# Patient Record
Sex: FEMALE | Race: WHITE | NOT HISPANIC OR LATINO | Employment: FULL TIME | URBAN - METROPOLITAN AREA
[De-identification: names, ages, dates, MRNs, and addresses within clinical notes are randomized per-mention and may not be internally consistent; named-entity substitution may affect disease eponyms.]

---

## 2021-02-17 ENCOUNTER — HOSPITAL ENCOUNTER (OUTPATIENT)
Dept: ULTRASOUND IMAGING | Age: 41
Discharge: HOME | End: 2021-02-17

## 2021-02-17 DIAGNOSIS — R60.0 EDEMA OF LEFT FOOT: ICD-10-CM

## 2021-02-17 PROCEDURE — 93926 LOWER EXTREMITY STUDY: CPT

## 2021-05-01 ENCOUNTER — IMMUNIZATION (OUTPATIENT)
Dept: LAB | Age: 41
End: 2021-05-01

## 2021-05-01 DIAGNOSIS — Z23 NEED FOR VACCINATION: Primary | ICD-10-CM

## 2021-05-01 PROCEDURE — 0001A COVID 19 PFIZER-BIONTECH: CPT | Performed by: HOSPITALIST

## 2021-05-01 PROCEDURE — 91300 COVID 19 PFIZER-BIONTECH: CPT | Performed by: HOSPITALIST

## 2021-05-22 ENCOUNTER — IMMUNIZATION (OUTPATIENT)
Dept: LAB | Age: 41
End: 2021-05-22
Attending: HOSPITALIST

## 2021-05-22 DIAGNOSIS — Z23 NEED FOR VACCINATION: Primary | ICD-10-CM

## 2021-05-22 PROCEDURE — 91300 COVID 19 PFIZER-BIONTECH: CPT | Performed by: HOSPITALIST

## 2021-05-22 PROCEDURE — 0002A COVID 19 PFIZER-BIONTECH: CPT | Performed by: HOSPITALIST

## 2021-08-06 PROBLEM — R20.0 NUMBNESS OF LEFT FOOT: Status: ACTIVE | Noted: 2021-08-06

## 2021-08-06 PROBLEM — M51.36 DDD (DEGENERATIVE DISC DISEASE), LUMBAR: Status: ACTIVE | Noted: 2021-08-06

## 2021-08-06 PROBLEM — R20.8 NUMBNESS OF LEFT FOOT: Status: ACTIVE | Noted: 2021-08-06

## 2021-09-29 ENCOUNTER — ORDER TRANSCRIPTION (OUTPATIENT)
Dept: ADMINISTRATIVE | Facility: HOSPITAL | Age: 41
End: 2021-09-29

## 2021-09-29 DIAGNOSIS — R93.7 ABNORMAL FINDINGS ON DIAGNOSTIC IMAGING OF MUSCULOSKELETAL SYSTEM: ICD-10-CM

## 2021-09-29 DIAGNOSIS — Z11.59 ENCOUNTER FOR SCREENING FOR OTHER VIRAL DISEASES: ICD-10-CM

## 2021-09-29 DIAGNOSIS — M54.16 LUMBAR RADICULOPATHY: Primary | ICD-10-CM

## 2021-09-29 DIAGNOSIS — Z01.818 PRE-OP TESTING: Primary | ICD-10-CM

## 2021-10-18 ENCOUNTER — LAB ENCOUNTER (OUTPATIENT)
Dept: LAB | Age: 41
End: 2021-10-18
Attending: PHYSICAL MEDICINE & REHABILITATION
Payer: COMMERCIAL

## 2021-10-18 DIAGNOSIS — Z11.59 ENCOUNTER FOR SCREENING FOR OTHER VIRAL DISEASES: ICD-10-CM

## 2021-10-18 DIAGNOSIS — Z01.818 PRE-OP TESTING: ICD-10-CM

## 2021-10-21 ENCOUNTER — NURSE ONLY (OUTPATIENT)
Dept: LAB | Facility: HOSPITAL | Age: 41
End: 2021-10-21
Attending: PHYSICAL MEDICINE & REHABILITATION
Payer: COMMERCIAL

## 2021-10-21 ENCOUNTER — HOSPITAL ENCOUNTER (OUTPATIENT)
Dept: CT IMAGING | Facility: HOSPITAL | Age: 41
Discharge: HOME | End: 2021-10-21
Attending: PHYSICAL MEDICINE & REHABILITATION
Payer: COMMERCIAL

## 2021-10-21 ENCOUNTER — HOSPITAL ENCOUNTER (OUTPATIENT)
Dept: GENERAL RADIOLOGY | Facility: HOSPITAL | Age: 41
Discharge: HOME | End: 2021-10-21
Attending: PHYSICAL MEDICINE & REHABILITATION
Payer: COMMERCIAL

## 2021-10-21 VITALS
DIASTOLIC BLOOD PRESSURE: 72 MMHG | RESPIRATION RATE: 25 BRPM | SYSTOLIC BLOOD PRESSURE: 114 MMHG | HEART RATE: 60 BPM | TEMPERATURE: 97 F | OXYGEN SATURATION: 100 %

## 2021-10-21 VITALS
HEIGHT: 64 IN | BODY MASS INDEX: 23.05 KG/M2 | WEIGHT: 135 LBS | TEMPERATURE: 98 F | RESPIRATION RATE: 16 BRPM | DIASTOLIC BLOOD PRESSURE: 68 MMHG | HEART RATE: 62 BPM | OXYGEN SATURATION: 98 % | SYSTOLIC BLOOD PRESSURE: 107 MMHG

## 2021-10-21 DIAGNOSIS — M54.16 LUMBAR RADICULOPATHY: ICD-10-CM

## 2021-10-21 DIAGNOSIS — M51.36 DDD (DEGENERATIVE DISC DISEASE), LUMBAR: Primary | ICD-10-CM

## 2021-10-21 DIAGNOSIS — R93.7 ABNORMAL FINDINGS ON DIAGNOSTIC IMAGING OF MUSCULOSKELETAL SYSTEM: ICD-10-CM

## 2021-10-21 DIAGNOSIS — M51.36 DDD (DEGENERATIVE DISC DISEASE), LUMBAR: ICD-10-CM

## 2021-10-21 PROCEDURE — 72132 CT LUMBAR SPINE W/DYE: CPT | Performed by: PHYSICAL MEDICINE & REHABILITATION

## 2021-10-21 PROCEDURE — 36415 COLL VENOUS BLD VENIPUNCTURE: CPT

## 2021-10-21 PROCEDURE — 81025 URINE PREGNANCY TEST: CPT

## 2021-10-21 PROCEDURE — 85610 PROTHROMBIN TIME: CPT

## 2021-10-21 PROCEDURE — 85049 AUTOMATED PLATELET COUNT: CPT

## 2021-10-21 PROCEDURE — 62304 MYELOGRAPHY LUMBAR INJECTION: CPT | Performed by: PHYSICAL MEDICINE & REHABILITATION

## 2021-10-21 RX ORDER — DIAZEPAM 5 MG/1
10 TABLET ORAL
Status: COMPLETED | OUTPATIENT
Start: 2021-10-21 | End: 2021-10-21

## 2021-10-21 RX ORDER — DIAZEPAM 5 MG/1
TABLET ORAL
Status: COMPLETED
Start: 2021-10-21 | End: 2021-10-21

## 2021-10-21 RX ADMIN — DIAZEPAM 10 MG: 5 TABLET ORAL at 07:55:00

## 2021-10-21 NOTE — IMAGING NOTE
Pt scheduled for myelogram with Dr. Austin Rome. Took valium as ordered. Confirmed ride home. Report called to [facility] in [address].

## 2021-10-21 NOTE — PROCEDURES
[facility]  Procedure Note    Manohar Mooney Patient Status:  Outpatient    1980 MRN [id number]   [facility] X-RAY Attending Tabitha Libman, MD   Hosp Day # 0 PCP None Pcp     Procedure: lumbar myelogram     Pre-Procedure Diagnosi

## 2021-10-26 NOTE — PROGRESS NOTES
Ernesto Israel,  I did speak with  one of our surgeons about this result. He thinks this is well fused and does not require surgical intervention. I am glad we got it looked at.       Dr Bree Liang

## 2022-11-17 ENCOUNTER — HOSPITAL ENCOUNTER (OUTPATIENT)
Age: 42
Discharge: HOME | End: 2022-11-17
Attending: EMERGENCY MEDICINE
Payer: COMMERCIAL

## 2022-11-17 VITALS
DIASTOLIC BLOOD PRESSURE: 70 MMHG | HEART RATE: 90 BPM | TEMPERATURE: 98 F | OXYGEN SATURATION: 98 % | SYSTOLIC BLOOD PRESSURE: 120 MMHG | RESPIRATION RATE: 20 BRPM

## 2022-11-17 DIAGNOSIS — U07.1 COVID-19: Primary | ICD-10-CM

## 2022-11-17 LAB
POCT INFLUENZA A: NEGATIVE
POCT INFLUENZA B: NEGATIVE
SARS-COV-2 RNA RESP QL NAA+PROBE: DETECTED

## 2022-11-17 PROCEDURE — 99203 OFFICE O/P NEW LOW 30 MIN: CPT

## 2022-11-17 PROCEDURE — 87502 INFLUENZA DNA AMP PROBE: CPT | Performed by: EMERGENCY MEDICINE

## 2022-11-17 PROCEDURE — 99213 OFFICE O/P EST LOW 20 MIN: CPT

## 2023-12-03 ENCOUNTER — HOSPITAL ENCOUNTER (EMERGENCY)
Age: 43
Discharge: HOME | End: 2023-12-03
Attending: EMERGENCY MEDICINE

## 2023-12-03 VITALS
SYSTOLIC BLOOD PRESSURE: 121 MMHG | DIASTOLIC BLOOD PRESSURE: 74 MMHG | OXYGEN SATURATION: 96 % | TEMPERATURE: 96.8 F | BODY MASS INDEX: 24.75 KG/M2 | HEIGHT: 64 IN | RESPIRATION RATE: 19 BRPM | WEIGHT: 145 LBS

## 2023-12-03 DIAGNOSIS — S86.812A STRAIN OF LEFT CALF MUSCLE: Primary | ICD-10-CM

## 2023-12-03 DIAGNOSIS — T14.8XXA MUSCLE TEAR: ICD-10-CM

## 2023-12-08 ENCOUNTER — OFFICE VISIT (OUTPATIENT)
Dept: SPORTS MEDICINE | Age: 43
End: 2023-12-08

## 2023-12-08 VITALS — WEIGHT: 145 LBS | HEIGHT: 64 IN | BODY MASS INDEX: 24.75 KG/M2

## 2023-12-08 DIAGNOSIS — R20.0 NUMBNESS AND TINGLING OF LEFT LEG: ICD-10-CM

## 2023-12-08 DIAGNOSIS — M79.662 PAIN OF LEFT CALF: ICD-10-CM

## 2023-12-08 DIAGNOSIS — R20.2 NUMBNESS AND TINGLING OF LEFT LEG: ICD-10-CM

## 2023-12-08 DIAGNOSIS — S86.112A RUPTURE OF LEFT GASTROCNEMIUS TENDON, INITIAL ENCOUNTER: Primary | ICD-10-CM

## 2023-12-08 PROCEDURE — 99204 OFFICE O/P NEW MOD 45 MIN: CPT | Performed by: FAMILY MEDICINE

## 2023-12-08 PROCEDURE — L4387 NON-PNEUM WALK BOOT PRE OTS: HCPCS | Performed by: FAMILY MEDICINE

## 2023-12-08 RX ORDER — BUPROPION HYDROCHLORIDE 300 MG/1
300 TABLET ORAL
COMMUNITY
Start: 2023-12-04

## 2023-12-08 RX ORDER — NORETHINDRONE ACETATE AND ETHINYL ESTRADIOL 1MG-20(21)
KIT ORAL
COMMUNITY
Start: 2020-05-15

## 2023-12-08 ASSESSMENT — ENCOUNTER SYMPTOMS
COLOR CHANGE: 0
BACK PAIN: 1
NERVOUS/ANXIOUS: 0
NAUSEA: 0
FEVER: 0
VOMITING: 0
FATIGUE: 0
SORE THROAT: 0
CONFUSION: 0
DIARRHEA: 0
COUGH: 0
WOUND: 0
WHEEZING: 0
CHILLS: 0
EYE ITCHING: 1
NUMBNESS: 1
SINUS PAIN: 0
EYE REDNESS: 1
EYE PAIN: 0
SLEEP DISTURBANCE: 0
DIZZINESS: 0
CHEST TIGHTNESS: 0

## 2023-12-18 ENCOUNTER — HOSPITAL ENCOUNTER (OUTPATIENT)
Dept: PHYSICAL MEDICINE AND REHAB | Age: 43
Discharge: STILL ADMITTED OR ADMINISTRATIVE | End: 2023-12-18
Attending: FAMILY MEDICINE

## 2023-12-18 PROCEDURE — 97112 NEUROMUSCULAR REEDUCATION: CPT | Performed by: PHYSICAL THERAPIST

## 2023-12-18 PROCEDURE — 97161 PT EVAL LOW COMPLEX 20 MIN: CPT | Performed by: PHYSICAL THERAPIST

## 2023-12-18 ASSESSMENT — MOVEMENT AND STRENGTH ASSESSMENTS
HOPPING: EXTREME DIFFICULTY OR UNABLE TO PERFORM ACTIVITY
RUNNING ON EVEN GROUND: EXTREME DIFFICULTY OR UNABLE TO PERFORM ACTIVITY
WALKING BETWEEN ROOMS: QUITE A BIT OF DIFFICULTY
ROLLING OVER IN BED: A LITTLE BIT OF DIFFICULTY
SITTING FOR 1 HOUR: NO DIFFICULTY
MAKING SHARP TURNS WHILE RUNNING FAST: EXTREME DIFFICULTY OR UNABLE TO PERFORM ACTIVITY
WALKING A MILE: EXTREME DIFFICULTY OR UNABLE TO PERFORM ACTIVITY
GETTING INTO OR OUT OF A CAR: MODERATE DIFFICULTY
YOUR USUAL HOBBIES, RECREATIONAL OR SPORTING ACTIVIITIES: EXTREME DIFFICULTY OR UNABLE TO PERFORM ACTIVITY
RUNNING ON UNEVEN GROUND: EXTREME DIFFICULTY OR UNABLE TO PERFORM ACTIVITY
PERFORMING HEAVY ACTIVITIES AROUND YOUR HOME: EXTREME DIFFICULTY OR UNABLE TO PERFORM ACTIVITY
PERFORMING LIGHT ACTIVITES AROUND YOUR HOME: QUITE A BIT OF DIFFICULTY
STANDING FOR 1 HOUR: EXTREME DIFFICULTY OR UNABLE TO PERFORM ACTIVITY
PUTTING ON YOUR SHOES OR SOCKS: MODERATE DIFFICULTY
GETTING INTO OR OUT OF THE BATH: QUITE A BIT OF DIFFICULTY
LIFTING AN OBJECT, LIKE A BAG OF GROCERIES, FROM THE FLOOR: MODERATE DIFFICULTY
SQUATTING: EXTREME DIFFICULTY OR UNABLE TO PERFORM ACTIVITY
TOTAL SCORE: 25
WALKING 2 BLOCKS: EXTREME DIFFICULTY OR UNABLE TO PERFORM ACTIVITY
ANY OF YOUR USUAL WORK, HOUSEWORK OR SCHOOL ACTIVITIES: MODERATE DIFFICULTY
GOING UP OR DOWN 10 STAIRS (ABOUT 1 FLIGHT OF STAIRS): MODERATE DIFFICULTY

## 2023-12-18 ASSESSMENT — ENCOUNTER SYMPTOMS
QUALITY: ACHE
ALLEVIATING FACTORS: OVER-THE-COUNTER MEDICATION
ALLEVIATING FACTORS: CHANGE IN POSITION
PAIN SCALE AT LOWEST: 0
QUALITY: SHARP
PAIN FREQUENCY: INTERMITTENT
SUBJECTIVE PAIN PROGRESSION: IMPROVED
PAIN SEVERITY NOW: 2
PAIN SCALE AT HIGHEST: 8
ALLEVIATING FACTORS: REST
ALLEVIATING FACTORS: SUPPORTIVE DEVICE

## 2023-12-27 ENCOUNTER — APPOINTMENT (OUTPATIENT)
Dept: PHYSICAL MEDICINE AND REHAB | Age: 43
End: 2023-12-27

## 2023-12-27 PROCEDURE — 97112 NEUROMUSCULAR REEDUCATION: CPT | Performed by: PHYSICAL THERAPIST

## 2023-12-27 PROCEDURE — 97110 THERAPEUTIC EXERCISES: CPT | Performed by: PHYSICAL THERAPIST

## 2023-12-27 ASSESSMENT — ENCOUNTER SYMPTOMS: PAIN SEVERITY NOW: 0

## 2024-01-03 ENCOUNTER — HOSPITAL ENCOUNTER (OUTPATIENT)
Dept: PHYSICAL MEDICINE AND REHAB | Age: 44
Discharge: STILL ADMITTED OR ADMINISTRATIVE | End: 2024-01-03

## 2024-01-03 PROCEDURE — 97112 NEUROMUSCULAR REEDUCATION: CPT | Performed by: PHYSICAL THERAPIST

## 2024-01-03 PROCEDURE — 97110 THERAPEUTIC EXERCISES: CPT | Performed by: PHYSICAL THERAPIST

## 2024-01-03 ASSESSMENT — ENCOUNTER SYMPTOMS: PAIN SEVERITY NOW: 1

## 2024-01-04 ENCOUNTER — OFFICE VISIT (OUTPATIENT)
Dept: SPORTS MEDICINE | Age: 44
End: 2024-01-04

## 2024-01-04 VITALS
BODY MASS INDEX: 24.75 KG/M2 | DIASTOLIC BLOOD PRESSURE: 78 MMHG | WEIGHT: 145 LBS | HEIGHT: 64 IN | SYSTOLIC BLOOD PRESSURE: 136 MMHG | HEART RATE: 112 BPM

## 2024-01-04 DIAGNOSIS — S86.812D STRAIN OF CALF MUSCLE, LEFT, SUBSEQUENT ENCOUNTER: Primary | ICD-10-CM

## 2024-01-04 DIAGNOSIS — R20.0 NUMBNESS AND TINGLING OF LEFT LEG: ICD-10-CM

## 2024-01-04 DIAGNOSIS — R20.2 NUMBNESS AND TINGLING OF LEFT LEG: ICD-10-CM

## 2024-01-04 DIAGNOSIS — M79.662 PAIN OF LEFT CALF: ICD-10-CM

## 2024-01-04 PROCEDURE — 99213 OFFICE O/P EST LOW 20 MIN: CPT | Performed by: FAMILY MEDICINE

## 2024-01-04 ASSESSMENT — ENCOUNTER SYMPTOMS
ABDOMINAL PAIN: 0
NAUSEA: 0
SEIZURES: 0
TROUBLE SWALLOWING: 0
SLEEP DISTURBANCE: 0
EYE PAIN: 0
AGITATION: 0
FATIGUE: 0
DIZZINESS: 0
SINUS PRESSURE: 0
PHOTOPHOBIA: 0
EYE REDNESS: 0
NERVOUS/ANXIOUS: 0
SORE THROAT: 0
HEADACHES: 0
ACTIVITY CHANGE: 0
CHEST TIGHTNESS: 0
SHORTNESS OF BREATH: 0
NUMBNESS: 0
BACK PAIN: 0
DIARRHEA: 0
CONSTIPATION: 0
FEVER: 0
WHEEZING: 0
SINUS PAIN: 0

## 2024-01-05 ENCOUNTER — E-ADVICE (OUTPATIENT)
Dept: SPORTS MEDICINE | Age: 44
End: 2024-01-05

## 2024-01-09 ENCOUNTER — MED INFO FORMS (OUTPATIENT)
Dept: ORTHOPEDICS | Age: 44
End: 2024-01-09

## 2024-01-10 ENCOUNTER — HOSPITAL ENCOUNTER (OUTPATIENT)
Dept: PHYSICAL MEDICINE AND REHAB | Age: 44
Discharge: STILL ADMITTED OR ADMINISTRATIVE | End: 2024-01-10

## 2024-01-10 PROCEDURE — 97112 NEUROMUSCULAR REEDUCATION: CPT | Performed by: PHYSICAL THERAPIST

## 2024-01-10 PROCEDURE — 97110 THERAPEUTIC EXERCISES: CPT | Performed by: PHYSICAL THERAPIST

## 2024-01-10 PROCEDURE — 97140 MANUAL THERAPY 1/> REGIONS: CPT | Performed by: PHYSICAL THERAPIST

## 2024-01-10 ASSESSMENT — ENCOUNTER SYMPTOMS
PAIN SEVERITY NOW: 0
PAIN SCALE AT HIGHEST: 5

## 2024-01-17 ENCOUNTER — HOSPITAL ENCOUNTER (OUTPATIENT)
Dept: PHYSICAL MEDICINE AND REHAB | Age: 44
Discharge: STILL ADMITTED OR ADMINISTRATIVE | End: 2024-01-17

## 2024-01-17 PROCEDURE — 97112 NEUROMUSCULAR REEDUCATION: CPT | Performed by: PHYSICAL THERAPIST

## 2024-01-17 PROCEDURE — 97140 MANUAL THERAPY 1/> REGIONS: CPT | Performed by: PHYSICAL THERAPIST

## 2024-01-17 ASSESSMENT — ENCOUNTER SYMPTOMS: PAIN SEVERITY NOW: 1

## 2024-01-24 ENCOUNTER — HOSPITAL ENCOUNTER (OUTPATIENT)
Dept: PHYSICAL MEDICINE AND REHAB | Age: 44
Discharge: STILL ADMITTED OR ADMINISTRATIVE | End: 2024-01-24

## 2024-01-24 PROCEDURE — 97112 NEUROMUSCULAR REEDUCATION: CPT | Performed by: PHYSICAL THERAPIST

## 2024-01-24 PROCEDURE — 97140 MANUAL THERAPY 1/> REGIONS: CPT | Performed by: PHYSICAL THERAPIST

## 2024-01-24 PROCEDURE — 97110 THERAPEUTIC EXERCISES: CPT | Performed by: PHYSICAL THERAPIST

## 2024-01-24 ASSESSMENT — ENCOUNTER SYMPTOMS: PAIN SEVERITY NOW: 0

## 2024-01-31 ENCOUNTER — APPOINTMENT (OUTPATIENT)
Dept: PHYSICAL MEDICINE AND REHAB | Age: 44
End: 2024-01-31

## 2024-02-05 ENCOUNTER — APPOINTMENT (OUTPATIENT)
Dept: SPORTS MEDICINE | Age: 44
End: 2024-02-05

## 2024-02-05 VITALS
HEART RATE: 71 BPM | WEIGHT: 145 LBS | SYSTOLIC BLOOD PRESSURE: 108 MMHG | HEIGHT: 64 IN | BODY MASS INDEX: 24.75 KG/M2 | DIASTOLIC BLOOD PRESSURE: 74 MMHG

## 2024-02-05 DIAGNOSIS — S86.812D STRAIN OF CALF MUSCLE, LEFT, SUBSEQUENT ENCOUNTER: Primary | ICD-10-CM

## 2024-02-05 DIAGNOSIS — M79.662 PAIN OF LEFT CALF: ICD-10-CM

## 2024-02-05 PROCEDURE — 99213 OFFICE O/P EST LOW 20 MIN: CPT | Performed by: FAMILY MEDICINE

## 2024-02-05 ASSESSMENT — ENCOUNTER SYMPTOMS
WEAKNESS: 0
FEVER: 0
FATIGUE: 0
SLEEP DISTURBANCE: 0
TROUBLE SWALLOWING: 0
BACK PAIN: 0
HEADACHES: 0
VOMITING: 0
EYE PAIN: 0
NUMBNESS: 0
SHORTNESS OF BREATH: 0
NERVOUS/ANXIOUS: 0
AGITATION: 0
CHEST TIGHTNESS: 0
WHEEZING: 0
LIGHT-HEADEDNESS: 0
ABDOMINAL PAIN: 0
NAUSEA: 0
CONSTIPATION: 0
WOUND: 0
CHILLS: 0
DIARRHEA: 0
SORE THROAT: 0
DIZZINESS: 0

## 2024-02-07 ENCOUNTER — HOSPITAL ENCOUNTER (OUTPATIENT)
Dept: PHYSICAL MEDICINE AND REHAB | Age: 44
Discharge: STILL ADMITTED OR ADMINISTRATIVE | End: 2024-02-07

## 2024-02-07 PROCEDURE — 97110 THERAPEUTIC EXERCISES: CPT

## 2024-02-07 PROCEDURE — 97112 NEUROMUSCULAR REEDUCATION: CPT

## 2024-02-07 ASSESSMENT — ENCOUNTER SYMPTOMS: PAIN SEVERITY NOW: 2

## 2024-02-14 ENCOUNTER — HOSPITAL ENCOUNTER (OUTPATIENT)
Dept: PHYSICAL MEDICINE AND REHAB | Age: 44
Discharge: STILL ADMITTED OR ADMINISTRATIVE | End: 2024-02-14

## 2024-02-14 PROCEDURE — 97112 NEUROMUSCULAR REEDUCATION: CPT

## 2024-02-14 PROCEDURE — 97110 THERAPEUTIC EXERCISES: CPT

## 2024-02-14 ASSESSMENT — ENCOUNTER SYMPTOMS: PAIN SEVERITY NOW: 1

## 2024-02-21 ENCOUNTER — HOSPITAL ENCOUNTER (OUTPATIENT)
Dept: PHYSICAL MEDICINE AND REHAB | Age: 44
Discharge: STILL ADMITTED OR ADMINISTRATIVE | End: 2024-02-21

## 2024-02-21 PROCEDURE — 97110 THERAPEUTIC EXERCISES: CPT

## 2024-02-21 ASSESSMENT — ENCOUNTER SYMPTOMS
PAIN SCALE AT HIGHEST: 8
PAIN SCALE AT LOWEST: 3
PAIN SEVERITY NOW: 6

## 2024-02-28 ENCOUNTER — HOSPITAL ENCOUNTER (OUTPATIENT)
Dept: PHYSICAL MEDICINE AND REHAB | Age: 44
Discharge: STILL ADMITTED OR ADMINISTRATIVE | End: 2024-02-28

## 2024-02-28 PROCEDURE — 97530 THERAPEUTIC ACTIVITIES: CPT | Performed by: PHYSICAL THERAPIST

## 2024-02-28 PROCEDURE — 97110 THERAPEUTIC EXERCISES: CPT | Performed by: PHYSICAL THERAPIST

## 2024-02-28 ASSESSMENT — ENCOUNTER SYMPTOMS
PAIN SCALE AT LOWEST: 0
PAIN SCALE AT HIGHEST: 0
PAIN SEVERITY NOW: 0

## 2024-02-28 ASSESSMENT — MOVEMENT AND STRENGTH ASSESSMENTS
RUNNING ON EVEN GROUND: NO DIFFICULTY
PERFORMING HEAVY ACTIVITIES AROUND YOUR HOME: A LITTLE BIT OF DIFFICULTY
RUNNING ON UNEVEN GROUND: A LITTLE BIT OF DIFFICULTY
GETTING INTO OR OUT OF THE BATH: NO DIFFICULTY
STANDING FOR 1 HOUR: NO DIFFICULTY
SQUATTING: NO DIFFICULTY
WALKING A MILE: NO DIFFICULTY
GETTING INTO OR OUT OF A CAR: NO DIFFICULTY
PERFORMING LIGHT ACTIVITES AROUND YOUR HOME: NO DIFFICULTY
TOTAL SCORE: 96.25
GOING UP OR DOWN 10 STAIRS (ABOUT 1 FLIGHT OF STAIRS): NO DIFFICULTY
PUTTING ON YOUR SHOES OR SOCKS: NO DIFFICULTY
MAKING SHARP TURNS WHILE RUNNING FAST: A LITTLE BIT OF DIFFICULTY
ANY OF YOUR USUAL WORK, HOUSEWORK OR SCHOOL ACTIVITIES: NO DIFFICULTY
ROLLING OVER IN BED: NO DIFFICULTY
LIFTING AN OBJECT, LIKE A BAG OF GROCERIES, FROM THE FLOOR: NO DIFFICULTY
YOUR USUAL HOBBIES, RECREATIONAL OR SPORTING ACTIVIITIES: NO DIFFICULTY
WALKING 2 BLOCKS: NO DIFFICULTY
HOPPING: NO DIFFICULTY
SITTING FOR 1 HOUR: NO DIFFICULTY
WALKING BETWEEN ROOMS: NO DIFFICULTY

## 2024-04-20 ENCOUNTER — HOSPITAL ENCOUNTER (EMERGENCY)
Age: 44
Discharge: HOME | End: 2024-04-20
Attending: EMERGENCY MEDICINE

## 2024-04-20 ENCOUNTER — APPOINTMENT (OUTPATIENT)
Dept: GENERAL RADIOLOGY | Age: 44
End: 2024-04-20
Attending: EMERGENCY MEDICINE

## 2024-04-20 VITALS
WEIGHT: 149.25 LBS | DIASTOLIC BLOOD PRESSURE: 77 MMHG | BODY MASS INDEX: 25.48 KG/M2 | RESPIRATION RATE: 16 BRPM | SYSTOLIC BLOOD PRESSURE: 117 MMHG | HEIGHT: 64 IN | TEMPERATURE: 98.8 F | HEART RATE: 82 BPM | OXYGEN SATURATION: 97 %

## 2024-04-20 DIAGNOSIS — M54.50 ACUTE LEFT-SIDED LOW BACK PAIN WITHOUT SCIATICA: Primary | ICD-10-CM

## 2024-04-20 PROCEDURE — 72100 X-RAY EXAM L-S SPINE 2/3 VWS: CPT

## 2024-04-20 PROCEDURE — 10002803 HB RX 637: Performed by: EMERGENCY MEDICINE

## 2024-04-20 PROCEDURE — 99283 EMERGENCY DEPT VISIT LOW MDM: CPT

## 2024-04-20 RX ORDER — CYCLOBENZAPRINE HCL 10 MG
5 TABLET ORAL ONCE
Status: COMPLETED | OUTPATIENT
Start: 2024-04-20 | End: 2024-04-20

## 2024-04-20 RX ORDER — CYCLOBENZAPRINE HCL 10 MG
10 TABLET ORAL 3 TIMES DAILY PRN
Qty: 15 TABLET | Refills: 0 | Status: SHIPPED | OUTPATIENT
Start: 2024-04-20

## 2024-04-20 RX ORDER — HYDROCODONE BITARTRATE AND ACETAMINOPHEN 10; 325 MG/1; MG/1
1 TABLET ORAL ONCE
Status: COMPLETED | OUTPATIENT
Start: 2024-04-20 | End: 2024-04-20

## 2024-04-20 RX ORDER — OXYCODONE HYDROCHLORIDE 5 MG/1
10 TABLET ORAL ONCE
Status: COMPLETED | OUTPATIENT
Start: 2024-04-20 | End: 2024-04-20

## 2024-04-20 RX ORDER — METHYLPREDNISOLONE 4 MG/1
4 TABLET ORAL SEE ADMIN INSTRUCTIONS
Qty: 21 TABLET | Refills: 0 | Status: SHIPPED | OUTPATIENT
Start: 2024-04-20

## 2024-04-20 RX ORDER — HYDROCODONE BITARTRATE AND ACETAMINOPHEN 10; 325 MG/1; MG/1
1 TABLET ORAL EVERY 6 HOURS PRN
Qty: 16 TABLET | Refills: 0 | Status: SHIPPED | OUTPATIENT
Start: 2024-04-20 | End: 2024-04-24

## 2024-04-20 RX ADMIN — CYCLOBENZAPRINE HYDROCHLORIDE 5 MG: 10 TABLET, FILM COATED ORAL at 10:00

## 2024-04-20 RX ADMIN — OXYCODONE HYDROCHLORIDE 10 MG: 5 TABLET ORAL at 12:08

## 2024-04-20 RX ADMIN — HYDROCODONE BITARTRATE AND ACETAMINOPHEN 1 TABLET: 10; 325 TABLET ORAL at 10:01

## 2024-04-20 SDOH — SOCIAL STABILITY: SOCIAL INSECURITY: HOW OFTEN DOES ANYONE, INCLUDING FAMILY AND FRIENDS, PHYSICALLY HURT YOU?: NEVER

## 2024-04-20 SDOH — SOCIAL STABILITY: SOCIAL INSECURITY: HOW OFTEN DOES ANYONE, INCLUDING FAMILY AND FRIENDS, THREATEN YOU WITH HARM?: NEVER

## 2024-04-20 SDOH — SOCIAL STABILITY: SOCIAL INSECURITY: HOW OFTEN DOES ANYONE, INCLUDING FAMILY AND FRIENDS, SCREAM OR CURSE AT YOU?: NEVER

## 2024-04-20 SDOH — SOCIAL STABILITY: SOCIAL INSECURITY: HOW OFTEN DOES ANYONE, INCLUDING FAMILY AND FRIENDS, INSULT OR TALK DOWN TO YOU?: NEVER

## 2024-04-20 ASSESSMENT — ENCOUNTER SYMPTOMS
VOMITING: 0
BACK PAIN: 1
FEVER: 0
COLOR CHANGE: 0
RHINORRHEA: 0
CHILLS: 0
SHORTNESS OF BREATH: 0
FATIGUE: 0
ABDOMINAL PAIN: 0
DIZZINESS: 0
LIGHT-HEADEDNESS: 0
AGITATION: 0
NAUSEA: 0
EYE PAIN: 0
CHEST TIGHTNESS: 0

## 2024-04-20 ASSESSMENT — PAIN SCALES - GENERAL: PAINLEVEL_OUTOF10: 10

## 2024-09-10 ENCOUNTER — HOSPITAL ENCOUNTER (OUTPATIENT)
Age: 44
Discharge: HOME | End: 2024-09-10
Payer: COMMERCIAL

## 2024-09-10 VITALS
RESPIRATION RATE: 17 BRPM | TEMPERATURE: 100 F | SYSTOLIC BLOOD PRESSURE: 124 MMHG | OXYGEN SATURATION: 98 % | DIASTOLIC BLOOD PRESSURE: 69 MMHG | HEART RATE: 86 BPM

## 2024-09-10 DIAGNOSIS — L03.213 PRESEPTAL CELLULITIS OF LEFT EYE: Primary | ICD-10-CM

## 2024-09-10 PROCEDURE — 99214 OFFICE O/P EST MOD 30 MIN: CPT

## 2024-09-10 PROCEDURE — 99213 OFFICE O/P EST LOW 20 MIN: CPT

## 2024-09-10 RX ORDER — KETOTIFEN FUMARATE 0.35 MG/ML
1 SOLUTION/ DROPS OPHTHALMIC 2 TIMES DAILY
Qty: 10 ML | Refills: 0 | Status: SHIPPED | OUTPATIENT
Start: 2024-09-10 | End: 2024-09-17

## 2024-09-10 NOTE — ED INITIAL ASSESSMENT (HPI)
Patient states she was outside yesterday evening and noted a bump near her left upper orbital bone.  States the area was itchy initially and she thought it may have been a mosquito bite.  Reports increased swelling to the area overnight.  Denies discharge from eye.  [place] fevers, chills.

## 2024-09-10 NOTE — ED PROVIDER NOTES
Chief Complaint   Patient presents with    Eye Problem       HPI:     Lindy Wilson is a 44 year old female who presents for evaluation of left superior eyelid swelling overnight, notes developed or outside last night thought potential mosquito bite without visualization of insect bite.  No swelling increasing this morning.  Denies associated eye pain blurred vision dizziness headache purulent discharge nasal congestion or sore throat.  Denies any topical or oral medications this morning.  Denies contact lens use.      PFSH    PFSH asessment screens reviewed and agree.  Nurses notes reviewed I agree with documentation.    Family History   Problem Relation Age of Onset    Diabetes Mother     Heart Attack Maternal Grandfather     Breast Cancer Sister 22     Family history reviewed with patient/caregiver and is not pertinent to presenting problem.  Social History     Socioeconomic History    Marital status:      Spouse name: Not on file    Number of children: Not on file    Years of education: Not on file    Highest education level: Not on file   Occupational History    Not on file   Tobacco Use    Smoking status: Former     Current packs/day: 0.00     Average packs/day: 1 pack/day for 15.0 years (15.0 ttl pk-yrs)     Types: Cigarettes     Start date: 1998     Quit date: 2013     Years since quittin.2    Smokeless tobacco: Never   Vaping Use    Vaping status: Never Used   Substance and Sexual Activity    Alcohol use: Yes     Comment: 1-2 times year    Drug use: Never    Sexual activity: Not on file   Other Topics Concern    Not on file   Social History Narrative    Not on file     Social Determinants of Health     Financial Resource Strain: Not on file   Food Insecurity: Not on file   Transportation Needs: Not on file   Physical Activity: Not on file   Stress: Not on file   Social Connections: Not on file   Housing Stability: Not on file         ROS:   Positive for stated complaint: Eyelid  swelling.  All other systems reviewed and negative except as noted above.  Constitutional and Vital Signs Reviewed.      Physical Exam:     Findings:    /69   Pulse 86   Temp 99.8 °F (37.7 °C) (Temporal)   Resp 17   SpO2 98%   GENERAL: well developed, well nourished, well hydrated, no distress  SKIN: good skin turgor, no obvious rashes  EXTREMITIES: no cyanosis or edema. VIDES without difficulty  GI: soft, non-tender, normal bowel sounds  HEAD: normocephalic, atraumatic  EYES: Noninjected conjunctive sclera bilaterally.  Pupils PERRL.  Vision grossly normal bilaterally.  No visualized hordeolum.  Mild inflammation along the left superior eyelid extending along the superior lateral periorbital region with associated erythema slight warmth.  No induration fluctuance or rigidity.  No purulence to the eye.   EARS: TMs clear bilaterally. Canals clear.  NOSE: nasal turbinates: pink, normal mucosa  THROAT: clear, without exudates, uvula midline, and airway patent  LUNGS: clear to auscultation bilaterally; no rales, rhonchi, or wheezes  NEURO: No focal deficits  PSYCH: Alert and oriented x3.  Answering questions appropriately.  Mood appropriate.    MDM/Assessment/Plan:   Orders for this encounter:    Orders Placed This Encounter    amoxicillin clavulanate 875-125 MG Oral Tab     Sig: Take 1 tablet by mouth 2 (two) times daily for 10 days.     Dispense:  20 tablet     Refill:  0    ketotifen 0.035 % Ophthalmic Solution     Sig: Place 1 drop into the left eye 2 (two) times daily for 7 days.     Dispense:  10 mL     Refill:  0       Labs performed this visit:  No results found for this or any previous visit (from the past 10 hour(s)).    MDM:  Patient agrees based on low-grade temp and presentation concerning of blepharitis versus a preseptal cellulitis.  Patient given a topical antihistamine for potential reactive allergen or environmental trigger versus Augmentin for bacterial coverage.  Instructed on precautions and  changes warranting reevaluation emergently versus outpatient through ophthalmology referral provided.    Diagnosis:    ICD-10-CM    1. Preseptal cellulitis of left eye  L03.213           All results reviewed and discussed with patient.  See AVS for detailed discharge instructions for your condition today.    Follow Up with:  Patricia Smith DO  [address] [facility] [address]  [phone]    Schedule an appointment as soon as possible for a visit in 3 days  For wound re-check, As needed, If symptoms worsen    Ventura Bolivar MD  [address]  [address]  [facility] [address] [phone]    Schedule an appointment as soon as possible for a visit in 3 days  As needed EYE REFERRAL